# Patient Record
Sex: MALE | Race: WHITE | NOT HISPANIC OR LATINO | RURAL
[De-identification: names, ages, dates, MRNs, and addresses within clinical notes are randomized per-mention and may not be internally consistent; named-entity substitution may affect disease eponyms.]

---

## 2022-01-06 PROCEDURE — 99281 EMR DPT VST MAYX REQ PHY/QHP: CPT

## 2022-01-06 PROCEDURE — 99283 EMERGENCY DEPT VISIT LOW MDM: CPT | Mod: ,,, | Performed by: FAMILY MEDICINE

## 2022-01-06 PROCEDURE — 99283 PR EMERGENCY DEPT VISIT,LEVEL III: ICD-10-PCS | Mod: ,,, | Performed by: FAMILY MEDICINE

## 2022-01-07 ENCOUNTER — HOSPITAL ENCOUNTER (EMERGENCY)
Facility: HOSPITAL | Age: 47
Discharge: STILL ADMITTED OR ADMINISTRATIVE | End: 2022-01-07

## 2022-01-07 VITALS
WEIGHT: 226 LBS | RESPIRATION RATE: 16 BRPM | SYSTOLIC BLOOD PRESSURE: 131 MMHG | TEMPERATURE: 98 F | HEIGHT: 73 IN | HEART RATE: 102 BPM | DIASTOLIC BLOOD PRESSURE: 89 MMHG | OXYGEN SATURATION: 98 % | BODY MASS INDEX: 29.95 KG/M2

## 2022-01-07 DIAGNOSIS — M79.10 MYALGIA: Primary | ICD-10-CM

## 2022-01-07 NOTE — ED PROVIDER NOTES
Encounter Date: 1/6/2022    SCRIBE #1 NOTE: I, Swati Chester, am scribing for, and in the presence of,  Dr. Maile Jeffrey. I have scribed the entire note.       History     Chief Complaint   Patient presents with    Generalized Body Aches     This is a 45 y/o white male, who presents to the ED for evaluation of COVID-like Sx. He states he has noticed generalized body aches. He has been exposed to COVID. He states he has had a normal PO intake but has had a fever and a HA. He denies any nausea or vomiting. He last took Tylenol/Mortin 6-7 hours ago. 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 There are no other complaints/pain in the ED at this time. There is no significant PMHX. Pt has had a left shoulder surgery on September 29,2021. There is no significant family medical Hx. There is no significant social Hx.      The history is provided by the patient. No  was used.     Review of patient's allergies indicates:  No Known Allergies  History reviewed. No pertinent past medical history.  Past Surgical History:   Procedure Laterality Date    FRACTURE SURGERY       History reviewed. No pertinent family history.  Social History     Tobacco Use    Smoking status: Current Every Day Smoker     Types: Cigarettes    Smokeless tobacco: Never Used   Substance Use Topics    Alcohol use: Yes    Drug use: Never     Review of Systems   Constitutional: Positive for fever. Negative for appetite change.   Gastrointestinal: Negative for diarrhea and vomiting.   Musculoskeletal: Positive for arthralgias.   All other systems reviewed and are negative.      Physical Exam     Initial Vitals [01/07/22 0023]   BP Pulse Resp Temp SpO2   131/89 102 16 97.7 °F (36.5 °C) 98 %      MAP       --         Physical Exam    Nursing note and vitals reviewed.  Constitutional: He appears well-developed and well-nourished. He appears ill. No distress.   HENT:   Head: Normocephalic.   Eyes: Pupils are equal, round, and  reactive to light.   Cardiovascular: Normal rate and regular rhythm.   Pulmonary/Chest: Breath sounds normal. No respiratory distress.     Neurological: He is alert and oriented to person, place, and time.   Psychiatric: He has a normal mood and affect.         ED Course   Procedures  Labs Reviewed - No data to display       Imaging Results    None          Medications - No data to display  Medical Decision Making:   ED Management:  Pt has been medically screened to the clinic.            Attending Attestation:           Physician Attestation for Scribe:  Physician Attestation Statement for Scribe #1: I, Maile Dillon M.D., reviewed documentation, as scribed by Swati Chester in my presence, and it is both accurate and complete.                      Clinical Impression:   Final diagnoses:  [M79.10] Myalgia (Primary)          ED Disposition Condition    Discharge Stable        ED Prescriptions     None        Follow-up Information    None          Maile Jeffrey MD  01/08/22 0436       Maile Jeffrey MD  01/08/22 0436

## 2022-01-07 NOTE — Clinical Note
"Farooq"Aaron No was seen and treated in our emergency department on 1/6/2022.  He may return to work on 01/08/2022.  Farooq should not be traveling with other people and he will need to have a COVID swab done.     If you have any questions or concerns, please don't hesitate to call.      Maile Jeffrey MD"

## 2022-01-20 ENCOUNTER — HOSPITAL ENCOUNTER (EMERGENCY)
Facility: HOSPITAL | Age: 47
Discharge: HOME | End: 2022-01-20

## 2022-01-20 VITALS
BODY MASS INDEX: 29.82 KG/M2 | RESPIRATION RATE: 18 BRPM | OXYGEN SATURATION: 96 % | SYSTOLIC BLOOD PRESSURE: 130 MMHG | TEMPERATURE: 98 F | DIASTOLIC BLOOD PRESSURE: 88 MMHG | HEART RATE: 100 BPM | WEIGHT: 225 LBS | HEIGHT: 73 IN

## 2022-01-20 DIAGNOSIS — L08.9 WOUND INFECTION: Primary | ICD-10-CM

## 2022-01-20 DIAGNOSIS — T14.8XXA WOUND INFECTION: Primary | ICD-10-CM

## 2022-01-20 PROCEDURE — 99284 EMERGENCY DEPT VISIT MOD MDM: CPT

## 2022-01-20 PROCEDURE — 99283 EMERGENCY DEPT VISIT LOW MDM: CPT | Mod: ,,, | Performed by: NURSE PRACTITIONER

## 2022-01-20 PROCEDURE — 90471 IMMUNIZATION ADMIN: CPT | Performed by: NURSE PRACTITIONER

## 2022-01-20 PROCEDURE — 99283 PR EMERGENCY DEPT VISIT,LEVEL III: ICD-10-PCS | Mod: ,,, | Performed by: NURSE PRACTITIONER

## 2022-01-20 PROCEDURE — 63600175 PHARM REV CODE 636 W HCPCS: Performed by: NURSE PRACTITIONER

## 2022-01-20 PROCEDURE — 90715 TDAP VACCINE 7 YRS/> IM: CPT | Performed by: NURSE PRACTITIONER

## 2022-01-20 RX ORDER — SULFAMETHOXAZOLE AND TRIMETHOPRIM 800; 160 MG/1; MG/1
1 TABLET ORAL 2 TIMES DAILY
Qty: 14 TABLET | Refills: 0 | Status: SHIPPED | OUTPATIENT
Start: 2022-01-20 | End: 2022-01-27

## 2022-01-20 RX ADMIN — TETANUS TOXOID, REDUCED DIPHTHERIA TOXOID AND ACELLULAR PERTUSSIS VACCINE, ADSORBED 0.5 ML: 5; 2.5; 8; 8; 2.5 SUSPENSION INTRAMUSCULAR at 05:01

## 2022-01-20 NOTE — Clinical Note
"Farooq Cabreratony No was seen and treated in our emergency department on 1/20/2022.  He may return to work on 01/24/2022.       If you have any questions or concerns, please don't hesitate to call.      SYDNI Hoffman"

## 2022-01-20 NOTE — ED PROVIDER NOTES
"Encounter Date: 1/20/2022       History     Chief Complaint   Patient presents with    Wound Infection     46 year old WM presents to ED with complaint of infected wound to foot after stepping on bolt. He states he stepped on bolt 2-3 days prior. Endorses area is tender to touch and he is unable to place on socks or shoes without pain. He states he is unsure of tetanus shot and would like "antibiotics and tetanus shot and go home".     The history is provided by the patient. No  was used.     Review of patient's allergies indicates:  No Known Allergies  History reviewed. No pertinent past medical history.  Past Surgical History:   Procedure Laterality Date    FRACTURE SURGERY       History reviewed. No pertinent family history.  Social History     Tobacco Use    Smoking status: Current Every Day Smoker     Types: Cigarettes    Smokeless tobacco: Never Used   Substance Use Topics    Alcohol use: Yes    Drug use: Never     Review of Systems   Skin: Positive for wound. Negative for color change.   All other systems reviewed and are negative.      Physical Exam     Initial Vitals [01/20/22 1658]   BP Pulse Resp Temp SpO2   130/88 100 18 98.2 °F (36.8 °C) 96 %      MAP       --         Physical Exam    Nursing note and vitals reviewed.  Constitutional: He appears well-developed and well-nourished.   HENT:   Head: Normocephalic and atraumatic.   Eyes: EOM are normal. Pupils are equal, round, and reactive to light.   Neck: Neck supple.   Normal range of motion.  Cardiovascular: Normal rate and regular rhythm.   Pulmonary/Chest: Breath sounds normal. He has no wheezes. He has no rhonchi.   Abdominal: Abdomen is soft. He exhibits no distension. There is no abdominal tenderness.   Musculoskeletal:         General: Tenderness present. No edema.      Cervical back: Normal range of motion and neck supple.        Feet:      Neurological: He is alert and oriented to person, place, and time.   Skin: Skin " is warm and dry. Capillary refill takes less than 2 seconds.         Medical Screening Exam   See Full Note    ED Course   Procedures  Labs Reviewed - No data to display       Imaging Results    None          Medications   Tdap (BOOSTRIX) vaccine injection 0.5 mL (0.5 mLs Intramuscular Given 1/20/22 7992)                       Clinical Impression:   Final diagnoses:  [T14.8XXA, L08.9] Wound infection (Primary)          ED Disposition Condition    Discharge Stable        ED Prescriptions     Medication Sig Dispense Start Date End Date Auth. Provider    sulfamethoxazole-trimethoprim 800-160mg (BACTRIM DS) 800-160 mg Tab Take 1 tablet by mouth 2 (two) times daily. for 7 days 14 tablet 1/20/2022 1/27/2022 SYDNI Hoffman        Follow-up Information    None          SYDNI Hoffman  01/24/22 6923